# Patient Record
Sex: MALE | Race: OTHER | NOT HISPANIC OR LATINO | URBAN - METROPOLITAN AREA
[De-identification: names, ages, dates, MRNs, and addresses within clinical notes are randomized per-mention and may not be internally consistent; named-entity substitution may affect disease eponyms.]

---

## 2018-09-02 ENCOUNTER — EMERGENCY (EMERGENCY)
Facility: HOSPITAL | Age: 28
LOS: 1 days | Discharge: HOME | End: 2018-09-02
Attending: EMERGENCY MEDICINE
Payer: COMMERCIAL

## 2018-09-02 VITALS
OXYGEN SATURATION: 96 % | DIASTOLIC BLOOD PRESSURE: 83 MMHG | TEMPERATURE: 97 F | RESPIRATION RATE: 20 BRPM | HEART RATE: 78 BPM | SYSTOLIC BLOOD PRESSURE: 125 MMHG

## 2018-09-02 VITALS
HEIGHT: 68 IN | TEMPERATURE: 98 F | OXYGEN SATURATION: 98 % | WEIGHT: 154.98 LBS | DIASTOLIC BLOOD PRESSURE: 90 MMHG | RESPIRATION RATE: 20 BRPM | HEART RATE: 89 BPM | SYSTOLIC BLOOD PRESSURE: 132 MMHG

## 2018-09-02 PROCEDURE — 99285 EMERGENCY DEPT VISIT HI MDM: CPT

## 2018-09-02 PROCEDURE — 99284 EMERGENCY DEPT VISIT MOD MDM: CPT | Mod: 25

## 2018-09-02 NOTE — ED PROVIDER NOTE - OBJECTIVE STATEMENT
28 y/o M BIBA from [place] to the ED for alcohol intoxication today. Pt is sleeping comfortable, arousable. History limited secondary to EtOH intoxication. 26 y/o M BIBA from [place] to the ED for alcohol intoxication today. Pt states he drank 3 EtOH drinks, called EMS himself. History limited secondary to EtOH intoxication.

## 2018-09-02 NOTE — ED ADULT NURSE NOTE - CHIEF COMPLAINT QUOTE
Patient presents to ER from [place] C/O alcohol intoxication, reports taking ecztacy, patient is calm and cooperative, resp even/unlabored. patient placed in yellow gown, belonging labeled and place in . Patient is HIV+

## 2018-09-02 NOTE — ED PROVIDER NOTE - PROGRESS NOTE DETAILS
ambulatory in ed, patient has called ambulance himself, a friend came to pick patient in ed, follow up disucxsed prior to dc h and p repeated

## 2018-09-02 NOTE — ED PROVIDER NOTE - PHYSICAL EXAMINATION
Constitutional: Appears comfortably, talking in full sentences  Head: NC/AT, no swelling  Eyes: EOMI, no swelling  Mouth: mm moist  Neck: supple, trachea is midline  Chest: Bilateral air entry, symmetrical chest expansion, no distress  Heart: S1 S2 distant  Abdomen: abd soft, non tender  Musc/Skel: extremities with no swelling, no deformity, no spine tenderness, distal pulses present  Neuro: A&Ox3, no focal deficits

## 2018-09-02 NOTE — ED ADULT TRIAGE NOTE - CHIEF COMPLAINT QUOTE
Patient presents to ER from [place] C/O alcohol intoxication, reports taking ecztacy, patient is calm and cooperative, resp even/unlabored. Patient presents to ER from [facility] C/O alcohol intoxication, reports taking ecztacy, patient is calm and cooperative, resp even/unlabored. patient placed in yellow gown, belonging labeled and place in . Patient presents to ER from [facility] C/O alcohol intoxication, reports taking ecztacy, patient is calm and cooperative, resp even/unlabored. patient placed in yellow gown, belonging labeled and place in . Patient is HIV+

## 2024-10-25 ENCOUNTER — OUTPATIENT (OUTPATIENT)
Dept: OUTPATIENT SERVICES | Facility: HOSPITAL | Age: 34
LOS: 1 days | Discharge: HOME | End: 2024-10-25

## 2024-11-05 DIAGNOSIS — B20 HUMAN IMMUNODEFICIENCY VIRUS [HIV] DISEASE: ICD-10-CM

## 2024-11-05 DIAGNOSIS — K83.01 PRIMARY SCLEROSING CHOLANGITIS: ICD-10-CM

## 2024-11-05 DIAGNOSIS — F60.3 BORDERLINE PERSONALITY DISORDER: ICD-10-CM

## 2024-11-05 DIAGNOSIS — F10.11 ALCOHOL ABUSE, IN REMISSION: ICD-10-CM

## 2024-11-05 NOTE — ECT CONSULT NOTE - NSECTASSESSRECOMM_PSY_ALL_CORE
Patient is a 33 year old man, domiciled alone, working in [employer] sales, PPHX of ADHD, borderline personality disorder, MDD, one prior inpatient psychiatric hospitalization, two prior SA (last in 2020), no NSSIB, +extensive trauma history, +substance use history (in remission), PMHx of HIV+, PSC (s/p cholecystectomy), now presenting for ECT consultation for recurrent treatment-resistant episodes of depression.      A course of ECT is a reasonable choice for the patient's treatment-resistant recurrent depression (with sx of anhedonia/amotivation, low mood, disrupted sleep, increased appetite, low energy).    Total time spent on ECT consultation and documentation: 180 minutes    The bulk of consult time (more than 50%) was spent in counselling and/or coordination of care, including but not limited to reviewing available medical records, obtaining collateral information from outpatient provider, discussing with the patient the risks and benefits of ECT, the usual trajectory of response with an acute course of ECT, discussing/explaining informed consent for ECT, and reviewing ECT fasting guidelines. It was explicitly reviewed with the patient that ECT has not been shown to be useful in the treatment of post-traumatic stress disorder, Borderline Personality Disorder, or substance use disorders, and that use of cannabinoids may interfere with ECT's effectiveness. The need for ongoing treatment with other modalities such as psychotherapy and medications was emphasized. There was nothing in the presentation to indicate that the patient did not fully comprehend the risks, benefits, and alternatives to ECT as explained; the acute ECT consent form was reviewed in detail including reading line-by-line the potential adverse effects of ECT such as death, permanent brain damage or dense and persistent cognitive impact. Time was also spent reviewing the need for periodic history and physical and labwork. Patient was asked to obtain labwork (CBC, BMP), EKG and medical clearance.  Referring psychiatrist was contacted.    of note -- during [facility] clearance process he was asked to receive a routine head-CT and an incidental finding of small R-sided infarct was present (denies any history of neurologic sx, denies focal neurologic deficits, met with neurologist to receive clearance for [facility] at that time). No further care was recommended or required, patient has not met with nor required neurologist otherwise. Patient has imaging and documentation, plans to send to unit. Discussed with Anesthesiology staff -- no further neurology workup required at this time.  Patient also has diagnosis of PSC. Will require medical clearance from PCP with explicit comment on liver function.     Plan:  - Pursue acute course of bifrontal ECT  - Patient will need to sign consents for acute bifrontal ECT  - Patient will require medical clearance (Labs, EKG, and explicit comment of PSC diagnosis)  - no dental consultation required.  - Fasting guidelines reviewed

## 2024-11-05 NOTE — ECT CONSULT NOTE - NSECTMENTALSTATUSEXAM_PSY_ALL_CORE
Conscious, cooperative, alert.   No psychomotor agitation/slowing.  Good eye contact.   Speech: regular rate and rhythm,   Mood: "Depressed" Affect: appropriate, full range.   Thought Process: linear, goal directed   Thought Content: No Death wish, No Suicidal ideation/intent/plan, No homicidal ideation/intent/plan. No delusions   Perception: No hallucinations   Insight and Judgement: fair  Impulse Control: fair at this time.

## 2024-11-05 NOTE — ECT CONSULT NOTE - NSECTTIMEACTIVITIES_PSY_ALL_CORE
In addition to time spent gathering and documenting complex medical history as noted above, extensive time was spent gathering collateral information, reviewing medical issues with ECT/anesthesiology staff, reviewing the informed consent process, discussing the normal trajectory of ECT treatment, discussing various lead placement options, and discussing the role of ECT in other psychiatric conditions like borderline personality disorder and substance use disorder.
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
## 2024-11-05 NOTE — ECT CONSULT NOTE - NSICDXBHSECONDARYDX_PSY_ALL_CORE
Borderline personality disorder in adult   F60.3  Mild alcohol abuse in sustained remission   F10.11  PSC (primary sclerosing cholangitis)   K83.01  HIV disease   B20

## 2024-11-05 NOTE — ECT CONSULT NOTE - DETAILS
denies/unknown (not in touch with family since age 18) Patient with distant SA (as documented in past history), denies any SI in the past several years. Denies any passive SI, or any other forms of suicidal thought.

## 2024-11-05 NOTE — ECT CONSULT NOTE - DESCRIPTION
HIV+   Primary sclerosing cholangitis  HSV2  Denies hx of MI, HTN, spine problems, kidney issues.   He does report incidental finding on routine head CT during prior medical clearance, noted above.

## 2024-11-05 NOTE — ECT CONSULT NOTE - CURRENT MEDICATION
MEDICATIONS  (STANDING):  Wellbutrin XL 150mg daily  Ondansetron 8mg qAM  Tivicay (Dolutegravir) 50mg daily - for HIV  Descovy (Emtricitabine / Tenofovir) 1 tab daily for HIV    MEDICATIONS  (PRN):  Adderall IR 20mg BID (as needed for work)  Valtrex 1mg daily (as needed for HSV2 flare-up)

## 2024-11-05 NOTE — ECT CONSULT NOTE - NSBHSOCIALHXDETAILSFT_PSY_A_CORE
Appendectomy – age 24  Liposuction/liposculpture and consequently Exploratory laparotomy with resulting infection/complication  rhinoplasty (unknown year)  Cholecystectomy - 2022 (most recent procedure)